# Patient Record
Sex: MALE | Race: WHITE | Employment: FULL TIME | URBAN - METROPOLITAN AREA
[De-identification: names, ages, dates, MRNs, and addresses within clinical notes are randomized per-mention and may not be internally consistent; named-entity substitution may affect disease eponyms.]

---

## 2018-07-13 PROBLEM — I10 ESSENTIAL HYPERTENSION: Status: ACTIVE | Noted: 2018-07-13

## 2018-07-13 PROBLEM — R73.03 PREDIABETES: Status: ACTIVE | Noted: 2018-07-13

## 2021-07-14 ENCOUNTER — HOSPITAL ENCOUNTER (OUTPATIENT)
Dept: GENERAL RADIOLOGY | Facility: CLINIC | Age: 56
Discharge: HOME | End: 2021-07-16
Payer: COMMERCIAL

## 2021-07-14 ENCOUNTER — HOSPITAL ENCOUNTER (OUTPATIENT)
Facility: CLINIC | Age: 56
Discharge: HOME | End: 2021-07-16
Payer: COMMERCIAL

## 2021-07-14 DIAGNOSIS — R05.9 COUGH: ICD-10-CM

## 2021-07-14 PROCEDURE — 71046 X-RAY EXAM CHEST 2 VIEWS: CPT

## 2021-10-19 ENCOUNTER — HOSPITAL ENCOUNTER (OUTPATIENT)
Facility: CLINIC | Age: 56
Discharge: HOME | End: 2021-10-19
Payer: COMMERCIAL

## 2021-10-19 DIAGNOSIS — Z12.5 SCREENING PSA (PROSTATE SPECIFIC ANTIGEN): ICD-10-CM

## 2021-10-19 DIAGNOSIS — R73.03 PREDIABETES: ICD-10-CM

## 2021-10-19 DIAGNOSIS — I10 ESSENTIAL HYPERTENSION: ICD-10-CM

## 2021-10-19 LAB
ALBUMIN SERPL-MCNC: 4.4 G/DL (ref 3.5–5.2)
ALBUMIN/GLOBULIN RATIO: 1.7 (ref 1–2.5)
ALP BLD-CCNC: 54 U/L (ref 40–129)
ALT SERPL-CCNC: 31 U/L (ref 5–41)
ANION GAP SERPL CALCULATED.3IONS-SCNC: 10 MMOL/L (ref 9–17)
AST SERPL-CCNC: 22 U/L
BILIRUB SERPL-MCNC: 1.06 MG/DL (ref 0.3–1.2)
BUN BLDV-MCNC: 18 MG/DL (ref 6–20)
BUN/CREAT BLD: ABNORMAL (ref 9–20)
CALCIUM SERPL-MCNC: 9.4 MG/DL (ref 8.6–10.4)
CHLORIDE BLD-SCNC: 101 MMOL/L (ref 98–107)
CHOLESTEROL, FASTING: 232 MG/DL
CHOLESTEROL/HDL RATIO: 4.5
CO2: 28 MMOL/L (ref 20–31)
CREAT SERPL-MCNC: 0.75 MG/DL (ref 0.7–1.2)
GFR AFRICAN AMERICAN: >60 ML/MIN
GFR NON-AFRICAN AMERICAN: >60 ML/MIN
GFR SERPL CREATININE-BSD FRML MDRD: ABNORMAL ML/MIN/{1.73_M2}
GFR SERPL CREATININE-BSD FRML MDRD: ABNORMAL ML/MIN/{1.73_M2}
GLUCOSE FASTING: 117 MG/DL (ref 70–99)
HDLC SERPL-MCNC: 51 MG/DL
LDL CHOLESTEROL: 125 MG/DL (ref 0–130)
POTASSIUM SERPL-SCNC: 4.7 MMOL/L (ref 3.7–5.3)
PROSTATE SPECIFIC ANTIGEN: 1.03 UG/L
SODIUM BLD-SCNC: 139 MMOL/L (ref 135–144)
TOTAL PROTEIN: 7 G/DL (ref 6.4–8.3)
TRIGLYCERIDE, FASTING: 279 MG/DL
VLDLC SERPL CALC-MCNC: ABNORMAL MG/DL (ref 1–30)

## 2021-10-19 PROCEDURE — 83036 HEMOGLOBIN GLYCOSYLATED A1C: CPT

## 2021-10-19 PROCEDURE — 36415 COLL VENOUS BLD VENIPUNCTURE: CPT

## 2021-10-19 PROCEDURE — 80053 COMPREHEN METABOLIC PANEL: CPT

## 2021-10-19 PROCEDURE — 80061 LIPID PANEL: CPT

## 2021-10-19 PROCEDURE — G0103 PSA SCREENING: HCPCS

## 2021-10-20 LAB
ESTIMATED AVERAGE GLUCOSE: 123 MG/DL
HBA1C MFR BLD: 5.9 % (ref 4–6)

## 2021-10-22 ENCOUNTER — HOSPITAL ENCOUNTER (OUTPATIENT)
Dept: GENERAL RADIOLOGY | Facility: CLINIC | Age: 56
Discharge: HOME | End: 2021-10-24
Payer: COMMERCIAL

## 2021-10-22 DIAGNOSIS — M25.472 LEFT ANKLE SWELLING: ICD-10-CM

## 2021-10-22 PROCEDURE — 73610 X-RAY EXAM OF ANKLE: CPT

## 2021-11-15 DIAGNOSIS — M25.572 ACUTE LEFT ANKLE PAIN: Primary | ICD-10-CM

## 2021-11-18 ENCOUNTER — OFFICE VISIT (OUTPATIENT)
Dept: ORTHOPEDIC SURGERY | Age: 56
End: 2021-11-18
Payer: COMMERCIAL

## 2021-11-18 VITALS — WEIGHT: 247 LBS | HEIGHT: 73 IN | BODY MASS INDEX: 32.74 KG/M2 | RESPIRATION RATE: 16 BRPM

## 2021-11-18 DIAGNOSIS — M24.573 EQUINUS CONTRACTURE OF ANKLE: ICD-10-CM

## 2021-11-18 DIAGNOSIS — M19.072 OSTEOARTHRITIS OF LEFT ANKLE OR FOOT: Primary | ICD-10-CM

## 2021-11-18 PROCEDURE — 99204 OFFICE O/P NEW MOD 45 MIN: CPT | Performed by: ORTHOPAEDIC SURGERY

## 2021-11-18 PROCEDURE — 20605 DRAIN/INJ JOINT/BURSA W/O US: CPT | Performed by: ORTHOPAEDIC SURGERY

## 2021-11-18 RX ORDER — LIDOCAINE HYDROCHLORIDE 10 MG/ML
2 INJECTION, SOLUTION INFILTRATION; PERINEURAL ONCE
Status: SHIPPED | OUTPATIENT
Start: 2021-11-18

## 2021-11-18 RX ORDER — TRIAMCINOLONE ACETONIDE 40 MG/ML
40 INJECTION, SUSPENSION INTRA-ARTICULAR; INTRAMUSCULAR ONCE
Status: SHIPPED | OUTPATIENT
Start: 2021-11-18

## 2021-11-18 NOTE — PROGRESS NOTES
[facility] AND [facility]TS MEDICINE  [facility]  [address] [address]  Dept: [phone]    Ambulatory Orthopedic Consult      CHIEF COMPLAINT:    Chief Complaint   Patient presents with    Ankle Pain     Left        HISTORY OF PRESENT ILLNESS:      The patient is a 55 y.o. male who is being seen for evaluation of the above, which began 15 years ago  . At today's visit, he is using no brace/assistive device. History is obtained today from:   [x]  the patient     [x]  EMR     []  one family member/friend    []  multiple family members/friends    []  other:          REVIEW OF SYSTEMS:  Musculoskeletal: See HPI for pertinent positives     Past Medical History:    He  has no past medical history on file. Past Surgical History:    He  has a past surgical history that includes Lithotripsy and Tonsillectomy. Current Medications:     Current Outpatient Medications:     benazepril (LOTENSIN) 20 MG tablet, Take 1 tablet by mouth once daily, Disp: 90 tablet, Rfl: 1    metFORMIN (GLUCOPHAGE) 500 MG tablet, TAKE 1 TABLET BY MOUTH TWICE DAILY WITH MEALS, Disp: 180 tablet, Rfl: 1    nebivolol (BYSTOLIC) 10 MG tablet, Take 1 tablet by mouth once daily, Disp: 90 tablet, Rfl: 1    sildenafil (VIAGRA) 50 MG tablet, Take 1 tablet by mouth daily as needed for Erectile Dysfunction, Disp: 30 tablet, Rfl: 0    VASCEPA 1 g CAPS capsule, Take 2 capsules by mouth twice daily, Disp: 360 capsule, Rfl: 2    traZODone (DESYREL) 50 MG tablet, TAKE 1 TABLET BY MOUTH NIGHTLY AS NEEDED FOR SLEEP, Disp: 30 tablet, Rfl: 0    fluticasone (FLOVENT HFA) 110 MCG/ACT inhaler, Inhale 1 puff into the lungs 2 times daily, Disp: 1 Inhaler, Rfl: 0    TWINRIX 720-20 injection, , Disp: , Rfl:      Allergies:    Patient has no known allergies. Family History:  family history includes Other in his mother.     Social History:   Social History     Occupational History    Not on file Tobacco Use    Smoking status: Never Smoker    Smokeless tobacco: Never Used   Substance and Sexual Activity    Alcohol use: Yes     Comment: 1-2 per week     Drug use: No    Sexual activity: Not on file         OBJECTIVE:  Resp 16   Ht 6' 1\" (1.854 m)   Wt 247 lb (112 kg)   BMI 32.59 kg/m²    Psych: awake, alert  Cardio:  well perfused extremities, no cyanosis  Resp:  normal respiratory effort  Musculoskeletal:    RLE:  Vascular: Limb well perfused, compartments soft/compressible. Skin: No erythema/ulcers. Intact. Neurovascular Status:  Grossly neurovascularly intact throughout   Tenderness to Palpation: anterior ankle joint line  -equinus      LLE:  Vascular: Limb well perfused, compartments soft/compressible. Skin: No erythema/ulcers. Intact. Neurovascular Status:  Grossly neurovascularly intact throughout   Tenderness to Palpation:   -      RADIOLOGY:   11/18/2021 FINDINGS:  Three weightbearing views (AP, Mortise, and Lateral) of the left ankle and three weightbearing views (AP, Oblique, Lateral) of the left foot were obtained in the office today and reviewed, revealing no acute fracture, dislocation, or radioopaque foreign body/tumor. The ankle mortise is maintained with no widening of the clear spaces. Degenerative changes of the tibiotalar with joint space narrowing, sclerosis, and osteophytes. IMPRESSION: No acute fracture/dislocation. Degenerative changes as above. Electronically signed by Ruddy Tripathi MD    Relevant previous imaging reviewed, both imaging and report(s) as below:    No results found. ASSESSMENT AND PLAN:  Body mass index is 32.59 kg/m². He has left ankle arthritis with varus tilt of the talus. Notably, he has the past medical history as above. He has a history of prediabetes.     We had a discussion today about the likely diagnosis and its natural history, physical exam and imaging findings, as well as various treatment and/or new imaging/labs results, we may consider the following options:    []  Lace up ankle     []  CAM boot         []  removable wrist brace     []  PT:        []  Wean out immobilization         []  Adv activity      []  Footmind        []  Spenco       []  Custom Orthotic:               []  AZ brace                    []  Rocker Bottom      []  Night splint    []  Heel cups        []  Strap        []  Toe gizmos    []  Topl        []  NSAIDs         []  Jordan        []  Ref:         []  Stress Xray    []  CT        []  MRI  []  Inj:          []  Consider OR      []  Pick OR date    No follow-ups on file. No orders of the defined types were placed in this encounter. Orders Placed This Encounter   Procedures    DME Order for Orthosis as OP     Eval and treat. Please provide custom [place] brace. Claudia Caraballo MD  Orthopedic Surgery, Foot and Ankle         Claudia Caraballo MD  Orthopedic Surgery        Please excuse any typos/errors, as this note was created with the assistance of voice recognition software. While intending to generate a document that actually reflects the content of the visit, the document can still have some errors including those of syntax and sound-a-like substitutions which may escape proof reading. In such instances, actual meaning can be extrapolated by context.

## 2021-11-18 NOTE — Clinical Note
[address] [place] [address] and Sports [address] [address]  Phone: [phone]  Fax: [phone] [facility], [place]    November 18, 2021     [id number]    Patient: Sancho Roblero   MR Number: [id number]   YOB: 1965   Date of Visit: 11/18/2021       Dear Sonia Beard:    Thank you for referring Jesica Sky to me for evaluation/treatment. Below are the relevant portions of my assessment and plan of care. He has ***. Notably, he has {MARY SINGLE:61281::\"a complex past medical history\",\"a somewhat complex past medical history\",\"the past medical history as above\",\"no relevant past medical history\"}. ***    We had a discussion today about the likely diagnosis and its natural history, physical exam and imaging findings, as well as various treatment options in detail. Surgically, we discussed a possible future ***, depending on the patient's clinical course. At today's visit, we did decide to proceed with conservative management. ***    Orders/referrals were placed as below at today's visit. The patient was referred to prosthetics and orthotics to obtain a custom [place] brace. ***    All questions were answered and the above plan was agreed upon. The patient will return to clinic {Mary single:04127::\"PRN\",\"in 1 week\",\"in 2 weeks\",\"in 1 month\",\"in 6 weeks\",\"in 6 weeks PRN\",\"in 8 weeks\",\"in 3 months\",\"in 3 months PRN\",\"in 6 months\",\"in 1 year\",\"after the CT has been obtained\",\"after the MRI has been obtained\",\"6 weeks after the brace has been obtained\",\"after the *** has been obtained\",\"for a surgical discussion\",\"postoperatively\",\"in *** weeks\",\"in *** months\"} ***. If you have questions, please do not hesitate to call me. I look forward to following Hattie Obrien along with you.     Sincerely,      Catalina Wong MD

## 2021-11-18 NOTE — LETTER
Dr. Arlette Casey  [address] [facility] [address]  [phone]        11/18/2021     Patient: Maikel Sexton  YOB: 1965    Dear Carolina Jaimes DO,    I had the pleasure of seeing one of your patients, Maikel Sexton recently in the office. Below are the relevant portions of my assessment and plan of care. ASSESSMENT AND PLAN:  He has left ankle arthritis with varus tilt of the talus. Notably, he has the past medical history as above. He has a history of prediabetes. We had a discussion today about the likely diagnosis and its natural history, physical exam and imaging findings, as well as various treatment options in detail. Surgically, we discussed a possible future total ankle replacement versus ankle fusion, depending on the patient's clinical course. We did discuss that I do believe the patient would be a good candidate for total ankle placement, whenever he wishes to proceed in the future, depending on his symptom control with conservative management. At today's visit, we did decide to proceed with conservative management, as he reports he has not tried much conservative management, and reports that he can still walk 3 to 4 miles, but does have significant pain with doing so. Orders/referrals were placed as below at today's visit. The patient was referred to prosthetics and orthotics to obtain a custom [place] brace. I provided a prescription for Voltaren (4g TOPL q QID PRN pain). -After discussing his options, both surgical and nonsurgical, he wished to proceed with a left ankle injection as below. All questions were answered and the above plan was agreed upon. The patient will return to clinic in 3 months without x-rays. At his next visit, we will follow-up on the results of his injection plus his [place] brace, and possibly consider repeat injection in the future.        I look

## 2022-03-03 ENCOUNTER — OFFICE VISIT (OUTPATIENT)
Dept: ORTHOPEDIC SURGERY | Age: 57
End: 2022-03-03
Payer: COMMERCIAL

## 2022-03-03 VITALS — WEIGHT: 247 LBS | HEIGHT: 73 IN | BODY MASS INDEX: 32.74 KG/M2 | RESPIRATION RATE: 16 BRPM

## 2022-03-03 DIAGNOSIS — M19.072 OSTEOARTHRITIS OF LEFT ANKLE OR FOOT: Primary | ICD-10-CM

## 2022-03-03 DIAGNOSIS — M24.573 EQUINUS CONTRACTURE OF ANKLE: ICD-10-CM

## 2022-03-03 PROCEDURE — 99212 OFFICE O/P EST SF 10 MIN: CPT | Performed by: ORTHOPAEDIC SURGERY

## 2022-03-03 NOTE — PROGRESS NOTES
[address] [place] [address] AND SPORTS MEDICINE  [facility]  [address] [place] [address]  Dept: [phone]    Ambulatory Orthopedic Consult      CHIEF COMPLAINT:    Chief Complaint   Patient presents with    Ankle Pain     Left        HISTORY OF PRESENT ILLNESS:      The patient is a 65 y.o. male who is being seen for evaluation of the above, which began 15 years ago  . At today's visit, he is using no brace/assistive device. History is obtained today from:   [x]  the patient     [x]  EMR     []  one family member/friend    []  multiple family members/friends    []  other:      INTERVAL HISTORY 3/3/2022:  He is seen again today in the office for follow up of a previous issue (as above). Since being seen last, the patient is doing about the same overall, but he reports that the pain is occurring less often. At today's visit, he is not using a brace or assistive device. History is obtained today from:   [x]  the patient     []  EMR     []  one family member/friend    []  multiple family members/friends    []  other:            REVIEW OF SYSTEMS:  Musculoskeletal: See HPI for pertinent positives     Past Medical History:    He  has no past medical history on file. Past Surgical History:    He  has a past surgical history that includes Lithotripsy and Tonsillectomy.      Current Medications:     Current Outpatient Medications:     diclofenac sodium (VOLTAREN) 1 % GEL, Apply 2 g topically 4 times daily as needed for Pain, Disp: 200 g, Rfl: 0    benazepril (LOTENSIN) 20 MG tablet, Take 1 tablet by mouth once daily, Disp: 90 tablet, Rfl: 1    metFORMIN (GLUCOPHAGE) 500 MG tablet, TAKE 1 TABLET BY MOUTH TWICE DAILY WITH MEALS, Disp: 180 tablet, Rfl: 1    nebivolol (BYSTOLIC) 10 MG tablet, Take 1 tablet by mouth once daily, Disp: 90 tablet, Rfl: 1    sildenafil (VIAGRA) 50 MG tablet, Take 1 tablet by mouth daily as needed for Erectile Dysfunction, Disp: 30 tablet, Rfl: 0    VASCEPA 1 g CAPS capsule, Take 2 capsules by mouth twice daily, Disp: 360 capsule, Rfl: 2    traZODone (DESYREL) 50 MG tablet, TAKE 1 TABLET BY MOUTH NIGHTLY AS NEEDED FOR SLEEP, Disp: 30 tablet, Rfl: 0    fluticasone (FLOVENT HFA) 110 MCG/ACT inhaler, Inhale 1 puff into the lungs 2 times daily, Disp: 1 Inhaler, Rfl: 0    TWINRIX 720-20 injection, , Disp: , Rfl:      Allergies:    Patient has no known allergies. Family History:  family history includes Other in his mother. Social History:   Social History     Occupational History    Not on file   Tobacco Use    Smoking status: Never Smoker    Smokeless tobacco: Never Used   Substance and Sexual Activity    Alcohol use: Yes     Comment: 1-2 per week     Drug use: No    Sexual activity: Not on file         OBJECTIVE:  Resp 16   Ht 6' 1\" (1.854 m)   Wt 247 lb (112 kg)   BMI 32.59 kg/m²    Psych: awake, alert  Cardio:  well perfused extremities, no cyanosis  Resp:  normal respiratory effort  Musculoskeletal:    RLE:  Vascular: Limb well perfused, compartments soft/compressible. Skin: No erythema/ulcers. Intact. Neurovascular Status:  Grossly neurovascularly intact throughout   Tenderness to Palpation: anterior ankle joint line  -equinus      LLE:  Vascular: Limb well perfused, compartments soft/compressible. Skin: No erythema/ulcers. Intact. Neurovascular Status:  Grossly neurovascularly intact throughout   Tenderness to Palpation:   -      RADIOLOGY:   3/3/2022 No new radiology images today. Prior images reviewed for reference. FINDINGS:  Three weightbearing views (AP, Mortise, and Lateral) of the left ankle and three weightbearing views (AP, Oblique, Lateral) of the left foot were obtained in the office today and reviewed, revealing no acute fracture, dislocation, or radioopaque foreign body/tumor. The ankle mortise is maintained with no widening of the clear spaces. Degenerative changes of the tibiotalar with joint space narrowing, sclerosis, and osteophytes. IMPRESSION: No acute fracture/dislocation. Degenerative changes as above. Electronically signed by Mis Russ MD    Relevant previous imaging reviewed, both imaging and report(s) as below:    No results found. ASSESSMENT AND PLAN:  Body mass index is 32.59 kg/m². He has left ankle arthritis with varus tilt of the talus. A left ankle injection on 11/19/2021 helped his pain approximately 90%. Notably, he has the past medical history as above. He has a history of prediabetes. We had a discussion today about the likely diagnosis and its natural history, physical exam and imaging findings, as well as various treatment options in detail. Surgically, we have again discussed a possible future total ankle placement versus ankle fusion, depending on his clinical course, and we have discussed that I do believe he would be a good candidate for a total ankle replacement, whenever he wishes to proceed in the future depending on his symptoms. At today's visit, as he reports that he is still able to walk 3 to 4 miles (albeit with significant pain), and as his injection is still significantly helping his pain, we discussed that we will continue with conservative management. Orders/referrals were placed as below at today's visit. He will continue to use his prescribed topical Voltaren gel as needed for pain, and may continue to use his custom [place] brace as needed for pain (he reports that it is bulky and somewhat impractical, but does report that it helps with his pain)    All questions were answered and the above plan was agreed upon. The patient will return to clinic in the future as needed. At his next visit, we may consider a repeat left ankle injection, and possibly consider surgery in the future whenever his symptoms warrant this.                      At the patient's next visit, depending on how the patient is doing and/or new imaging/labs results, we may consider the following options:    []  Lace up ankle     []  CAM boot         []  removable wrist brace     []  PT:        []  Wean out immobilization         []  Adv activity      []  Footmind        []  Spenco       []  Custom Orthotic:               []  AZ brace                    []  Rocker Bottom      []  Night splint    []  Heel cups        []  Strap        []  Toe gizmos    []  Topl        []  NSAIDs         []  Jordan        []  Ref:         []  Stress Xray    []  CT        []  MRI  []  Inj:          []  Consider OR      []  Pick OR date    No follow-ups on file. No orders of the defined types were placed in this encounter. No orders of the defined types were placed in this encounter. Delio Camargo MD  Orthopedic Surgery        Please excuse any typos/errors, as this note was created with the assistance of voice recognition software. While intending to generate a document that actually reflects the content of the visit, the document can still have some errors including those of syntax and sound-a-like substitutions which may escape proof reading. In such instances, actual meaning can be extrapolated by context.

## 2022-04-21 ENCOUNTER — HOSPITAL ENCOUNTER (OUTPATIENT)
Facility: CLINIC | Age: 57
Discharge: HOME | End: 2022-04-21
Payer: COMMERCIAL

## 2022-04-21 DIAGNOSIS — I10 ESSENTIAL HYPERTENSION: ICD-10-CM

## 2022-04-21 DIAGNOSIS — R73.03 PREDIABETES: ICD-10-CM

## 2022-04-21 LAB
ALBUMIN SERPL-MCNC: 4.5 G/DL (ref 3.5–5.2)
ALBUMIN/GLOBULIN RATIO: 2 (ref 1–2.5)
ALP BLD-CCNC: 53 U/L (ref 40–129)
ALT SERPL-CCNC: 24 U/L (ref 5–41)
ANION GAP SERPL CALCULATED.3IONS-SCNC: 11 MMOL/L (ref 9–17)
AST SERPL-CCNC: 18 U/L
BILIRUB SERPL-MCNC: 1.01 MG/DL (ref 0.3–1.2)
BUN BLDV-MCNC: 15 MG/DL (ref 6–20)
CALCIUM SERPL-MCNC: 9.5 MG/DL (ref 8.6–10.4)
CHLORIDE BLD-SCNC: 101 MMOL/L (ref 98–107)
CO2: 28 MMOL/L (ref 20–31)
CREAT SERPL-MCNC: 0.69 MG/DL (ref 0.7–1.2)
ESTIMATED AVERAGE GLUCOSE: 123 MG/DL
GFR AFRICAN AMERICAN: >60 ML/MIN
GFR NON-AFRICAN AMERICAN: >60 ML/MIN
GFR SERPL CREATININE-BSD FRML MDRD: ABNORMAL ML/MIN/{1.73_M2}
GLUCOSE FASTING: 110 MG/DL (ref 70–99)
HBA1C MFR BLD: 5.9 % (ref 4–6)
POTASSIUM SERPL-SCNC: 4.6 MMOL/L (ref 3.7–5.3)
SODIUM BLD-SCNC: 140 MMOL/L (ref 135–144)
TOTAL PROTEIN: 6.8 G/DL (ref 6.4–8.3)

## 2022-04-21 PROCEDURE — 80053 COMPREHEN METABOLIC PANEL: CPT

## 2022-04-21 PROCEDURE — 36415 COLL VENOUS BLD VENIPUNCTURE: CPT

## 2022-04-21 PROCEDURE — 83036 HEMOGLOBIN GLYCOSYLATED A1C: CPT

## 2022-11-02 ENCOUNTER — HOSPITAL ENCOUNTER (OUTPATIENT)
Facility: CLINIC | Age: 57
Discharge: HOME | End: 2022-11-02
Payer: COMMERCIAL

## 2022-11-02 DIAGNOSIS — R73.03 PREDIABETES: ICD-10-CM

## 2022-11-02 DIAGNOSIS — E78.1 HYPERTRIGLYCERIDEMIA: ICD-10-CM

## 2022-11-02 LAB
ALBUMIN SERPL-MCNC: 4.4 G/DL (ref 3.5–5.2)
ALBUMIN/GLOBULIN RATIO: 1.6 (ref 1–2.5)
ALP BLD-CCNC: 53 U/L (ref 40–129)
ALT SERPL-CCNC: 20 U/L (ref 5–41)
ANION GAP SERPL CALCULATED.3IONS-SCNC: 11 MMOL/L (ref 9–17)
AST SERPL-CCNC: 17 U/L
BILIRUB SERPL-MCNC: 1.1 MG/DL (ref 0.3–1.2)
BUN BLDV-MCNC: 15 MG/DL (ref 6–20)
CALCIUM SERPL-MCNC: 9.2 MG/DL (ref 8.6–10.4)
CHLORIDE BLD-SCNC: 101 MMOL/L (ref 98–107)
CHOLESTEROL, FASTING: 245 MG/DL
CHOLESTEROL/HDL RATIO: 4.9
CO2: 27 MMOL/L (ref 20–31)
CREAT SERPL-MCNC: 0.7 MG/DL (ref 0.7–1.2)
GFR SERPL CREATININE-BSD FRML MDRD: >60 ML/MIN/1.73M2
GLUCOSE FASTING: 98 MG/DL (ref 70–99)
HDLC SERPL-MCNC: 50 MG/DL
LDL CHOLESTEROL: 144 MG/DL (ref 0–130)
POTASSIUM SERPL-SCNC: 4.3 MMOL/L (ref 3.7–5.3)
PROSTATE SPECIFIC ANTIGEN: 1.21 NG/ML
SODIUM BLD-SCNC: 139 MMOL/L (ref 135–144)
TOTAL PROTEIN: 7.2 G/DL (ref 6.4–8.3)
TRIGLYCERIDE, FASTING: 257 MG/DL

## 2022-11-02 PROCEDURE — 83036 HEMOGLOBIN GLYCOSYLATED A1C: CPT

## 2022-11-02 PROCEDURE — 80061 LIPID PANEL: CPT

## 2022-11-02 PROCEDURE — 80053 COMPREHEN METABOLIC PANEL: CPT

## 2022-11-02 PROCEDURE — 36415 COLL VENOUS BLD VENIPUNCTURE: CPT

## 2022-11-02 PROCEDURE — G0103 PSA SCREENING: HCPCS

## 2022-11-03 LAB
ESTIMATED AVERAGE GLUCOSE: 123 MG/DL
HBA1C MFR BLD: 5.9 % (ref 4–6)

## 2022-11-04 PROBLEM — N52.8 OTHER MALE ERECTILE DYSFUNCTION: Status: ACTIVE | Noted: 2022-11-04

## 2022-11-04 PROBLEM — E78.1 HYPERTRIGLYCERIDEMIA: Status: ACTIVE | Noted: 2022-11-04

## 2023-06-21 ENCOUNTER — OFFICE VISIT (OUTPATIENT)
Dept: PRIMARY CARE CLINIC | Age: 58
End: 2023-06-21
Payer: COMMERCIAL

## 2023-06-21 VITALS
SYSTOLIC BLOOD PRESSURE: 106 MMHG | BODY MASS INDEX: 31.68 KG/M2 | DIASTOLIC BLOOD PRESSURE: 70 MMHG | HEART RATE: 57 BPM | HEIGHT: 73 IN | WEIGHT: 239 LBS | OXYGEN SATURATION: 97 %

## 2023-06-21 DIAGNOSIS — I10 ESSENTIAL HYPERTENSION: ICD-10-CM

## 2023-06-21 DIAGNOSIS — N52.8 OTHER MALE ERECTILE DYSFUNCTION: ICD-10-CM

## 2023-06-21 DIAGNOSIS — E78.1 HYPERTRIGLYCERIDEMIA: Primary | ICD-10-CM

## 2023-06-21 DIAGNOSIS — R73.03 PREDIABETES: ICD-10-CM

## 2023-06-21 PROCEDURE — 99214 OFFICE O/P EST MOD 30 MIN: CPT | Performed by: FAMILY MEDICINE

## 2023-06-21 PROCEDURE — 3078F DIAST BP <80 MM HG: CPT | Performed by: FAMILY MEDICINE

## 2023-06-21 PROCEDURE — 3074F SYST BP LT 130 MM HG: CPT | Performed by: FAMILY MEDICINE

## 2023-06-21 SDOH — ECONOMIC STABILITY: FOOD INSECURITY: WITHIN THE PAST 12 MONTHS, YOU WORRIED THAT YOUR FOOD WOULD RUN OUT BEFORE YOU GOT MONEY TO BUY MORE.: NEVER TRUE

## 2023-06-21 SDOH — ECONOMIC STABILITY: INCOME INSECURITY: HOW HARD IS IT FOR YOU TO PAY FOR THE VERY BASICS LIKE FOOD, HOUSING, MEDICAL CARE, AND HEATING?: NOT HARD AT ALL

## 2023-06-21 SDOH — ECONOMIC STABILITY: FOOD INSECURITY: WITHIN THE PAST 12 MONTHS, THE FOOD YOU BOUGHT JUST DIDN'T LAST AND YOU DIDN'T HAVE MONEY TO GET MORE.: NEVER TRUE

## 2023-06-21 SDOH — ECONOMIC STABILITY: HOUSING INSECURITY
IN THE LAST 12 MONTHS, WAS THERE A TIME WHEN YOU DID NOT HAVE A STEADY PLACE TO SLEEP OR SLEPT IN A SHELTER (INCLUDING NOW)?: NO

## 2023-06-21 ASSESSMENT — PATIENT HEALTH QUESTIONNAIRE - PHQ9
1. LITTLE INTEREST OR PLEASURE IN DOING THINGS: 0
SUM OF ALL RESPONSES TO PHQ QUESTIONS 1-9: 0
SUM OF ALL RESPONSES TO PHQ QUESTIONS 1-9: 0
SUM OF ALL RESPONSES TO PHQ9 QUESTIONS 1 & 2: 0
SUM OF ALL RESPONSES TO PHQ QUESTIONS 1-9: 0
2. FEELING DOWN, DEPRESSED OR HOPELESS: 0
SUM OF ALL RESPONSES TO PHQ QUESTIONS 1-9: 0

## 2023-06-21 NOTE — PROGRESS NOTES
[facility]  [address]   [place],  [address]  Phone: [phone]       Name: Jamil Key  : 1965     Chief Complaint:    Jamil Key is a 63y.o. year old male who presents today for   Chief Complaint   Patient presents with    Hypertension    6 Month Follow-Up     History of Present Illness:    HTN -  patient here for follow up on HTN. He has been taking medications as prescribed. Hypertriglyceridemia - patient here for follow up. Last labs were 2022. He is getting vascepa without issues, as far as he knows. ED - taking viagra PRN.      Medications:    Outpatient Medications Prior to Visit   Medication Sig Dispense Refill    metFORMIN (GLUCOPHAGE) 500 MG tablet TAKE 1 TABLET BY MOUTH TWICE DAILY WITH MEALS 180 tablet 0    benazepril (LOTENSIN) 20 MG tablet Take 1 tablet by mouth once daily 90 tablet 0    nebivolol (BYSTOLIC) 10 MG tablet Take 1 tablet by mouth once daily 90 tablet 0    VASCEPA 1 g CAPS capsule Take 2 capsules by mouth twice daily 360 capsule 0    sildenafil (VIAGRA) 50 MG tablet Take 1 tablet by mouth daily as needed for Erectile Dysfunction 30 tablet 0    diclofenac sodium (VOLTAREN) 1 % GEL Apply 2 g topically 4 times daily as needed for Pain (Patient not taking: Reported on 2023) 200 g 0    fluticasone (FLOVENT HFA) 110 MCG/ACT inhaler Inhale 1 puff into the lungs 2 times daily (Patient not taking: Reported on 2023) 1 Inhaler 0    TWINRIX 720-20 injection        Facility-Administered Medications Prior to Visit   Medication Dose Route Frequency Provider Last Rate Last Admin    lidocaine 1 % injection 2 mL  2 mL Intra-artICUlar Once Sierra Michele MD        triamcinolone acetonide (KENALOG-40) injection 40 mg  40 mg Intra-artICUlar Once Sierra Michele MD           Review of Systems:     Review of Systems     Physical Exam:     Vitals:  /70 (Site: Left Upper Arm, Position: Sitting, Cuff Size: Large Adult)   Pulse 57

## 2023-07-19 ENCOUNTER — OFFICE VISIT (OUTPATIENT)
Dept: PRIMARY CARE CLINIC | Age: 58
End: 2023-07-19
Payer: COMMERCIAL

## 2023-07-19 VITALS
SYSTOLIC BLOOD PRESSURE: 128 MMHG | WEIGHT: 241 LBS | DIASTOLIC BLOOD PRESSURE: 84 MMHG | HEIGHT: 73 IN | BODY MASS INDEX: 31.94 KG/M2 | HEART RATE: 63 BPM | OXYGEN SATURATION: 96 %

## 2023-07-19 DIAGNOSIS — M70.22 OLECRANON BURSITIS OF LEFT ELBOW: Primary | ICD-10-CM

## 2023-07-19 DIAGNOSIS — N52.8 OTHER MALE ERECTILE DYSFUNCTION: ICD-10-CM

## 2023-07-19 PROCEDURE — 3079F DIAST BP 80-89 MM HG: CPT | Performed by: FAMILY MEDICINE

## 2023-07-19 PROCEDURE — 3074F SYST BP LT 130 MM HG: CPT | Performed by: FAMILY MEDICINE

## 2023-07-19 PROCEDURE — 99213 OFFICE O/P EST LOW 20 MIN: CPT | Performed by: FAMILY MEDICINE

## 2023-07-19 RX ORDER — PREDNISONE 50 MG/1
50 TABLET ORAL DAILY
Qty: 5 TABLET | Refills: 0 | Status: SHIPPED | OUTPATIENT
Start: 2023-07-19 | End: 2023-07-24

## 2023-07-19 RX ORDER — SILDENAFIL 50 MG/1
50 TABLET, FILM COATED ORAL DAILY PRN
Qty: 30 TABLET | Refills: 0 | Status: SHIPPED | OUTPATIENT
Start: 2023-07-19

## 2023-09-14 DIAGNOSIS — E78.1 HYPERTRIGLYCERIDEMIA: ICD-10-CM

## 2023-09-14 DIAGNOSIS — R73.03 PREDIABETES: ICD-10-CM

## 2023-09-14 DIAGNOSIS — I10 ESSENTIAL HYPERTENSION: ICD-10-CM

## 2023-09-14 RX ORDER — ICOSAPENT ETHYL 1000 MG/1
2 CAPSULE ORAL 2 TIMES DAILY
Qty: 360 CAPSULE | Refills: 0 | Status: SHIPPED | OUTPATIENT
Start: 2023-09-14

## 2023-09-14 RX ORDER — BENAZEPRIL HYDROCHLORIDE 20 MG/1
20 TABLET ORAL DAILY
Qty: 90 TABLET | Refills: 0 | Status: SHIPPED | OUTPATIENT
Start: 2023-09-14

## 2023-09-14 RX ORDER — NEBIVOLOL 10 MG/1
10 TABLET ORAL DAILY
Qty: 90 TABLET | Refills: 0 | Status: SHIPPED | OUTPATIENT
Start: 2023-09-14

## 2023-09-14 NOTE — TELEPHONE ENCOUNTER
Looks like in chart there has been requests sent to [address] [place] [address] (8/30/23 and 9/11/23) and refused and he has been out of medication since 8/24/23 give or take    James Calderon is requesting a refill on the following medication(s):  Requested Prescriptions     Pending Prescriptions Disp Refills    benazepril (LOTENSIN) 20 MG tablet 90 tablet 0     Sig: Take 1 tablet by mouth daily    nebivolol (BYSTOLIC) 10 MG tablet 90 tablet 0     Sig: Take 1 tablet by mouth daily       Last Visit Date (If Applicable):  5/15/2819    Next Visit Date:    12/4/2023

## 2023-09-14 NOTE — TELEPHONE ENCOUNTER
Looks like in chart there has been requests sent to [address] (8/30/23 and 9/11/23) and refused and he has been out of medication since 8/24/23 give or take      Fan Payton is requesting a refill on the following medication(s):  Requested Prescriptions     Pending Prescriptions Disp Refills    metFORMIN (GLUCOPHAGE) 500 MG tablet 180 tablet 0     Sig: Take 1 tablet by mouth with breakfast and with evening meal    VASCEPA 1 g CAPS capsule 360 capsule 0     Sig: Take 2 capsules by mouth 2 times daily       Last Visit Date (If Applicable):  7/19/1175    Next Visit Date:    12/4/2023

## 2023-09-14 NOTE — TELEPHONE ENCOUNTER
Looks like in chart there has been a request sent to [address] [place] [address] (9/11/23) and refused and he has been out of medication since 8/24/23 give or take      Delphine Favre is requesting a refill on the following medication(s):  Requested Prescriptions     Pending Prescriptions Disp Refills    metFORMIN (GLUCOPHAGE) 500 MG tablet 180 tablet 0     Sig: Take 1 tablet by mouth with breakfast and with evening meal       Last Visit Date (If Applicable):  0/07/3659    Next Visit Date:    12/4/2023

## 2023-09-27 ENCOUNTER — OFFICE VISIT (OUTPATIENT)
Dept: FAMILY MEDICINE CLINIC | Age: 58
End: 2023-09-27
Payer: COMMERCIAL

## 2023-09-27 VITALS
BODY MASS INDEX: 32.2 KG/M2 | SYSTOLIC BLOOD PRESSURE: 120 MMHG | HEIGHT: 73 IN | HEART RATE: 57 BPM | WEIGHT: 243 LBS | OXYGEN SATURATION: 97 % | DIASTOLIC BLOOD PRESSURE: 74 MMHG

## 2023-09-27 DIAGNOSIS — R73.03 PREDIABETES: ICD-10-CM

## 2023-09-27 DIAGNOSIS — E78.1 HYPERTRIGLYCERIDEMIA: ICD-10-CM

## 2023-09-27 DIAGNOSIS — Z12.5 SCREENING PSA (PROSTATE SPECIFIC ANTIGEN): ICD-10-CM

## 2023-09-27 DIAGNOSIS — N52.8 OTHER MALE ERECTILE DYSFUNCTION: ICD-10-CM

## 2023-09-27 DIAGNOSIS — I10 ESSENTIAL HYPERTENSION: Primary | ICD-10-CM

## 2023-09-27 PROCEDURE — 3074F SYST BP LT 130 MM HG: CPT | Performed by: FAMILY MEDICINE

## 2023-09-27 PROCEDURE — 3078F DIAST BP <80 MM HG: CPT | Performed by: FAMILY MEDICINE

## 2023-09-27 PROCEDURE — 90715 TDAP VACCINE 7 YRS/> IM: CPT | Performed by: FAMILY MEDICINE

## 2023-09-27 PROCEDURE — 90471 IMMUNIZATION ADMIN: CPT | Performed by: FAMILY MEDICINE

## 2023-09-27 PROCEDURE — 99214 OFFICE O/P EST MOD 30 MIN: CPT | Performed by: FAMILY MEDICINE

## 2023-09-27 RX ORDER — SEMAGLUTIDE 0.25 MG/.5ML
0.25 INJECTION, SOLUTION SUBCUTANEOUS
Qty: 2 ML | Refills: 0 | Status: SHIPPED | OUTPATIENT
Start: 2023-09-27

## 2023-09-27 RX ORDER — SEMAGLUTIDE 0.25 MG/.5ML
0.25 INJECTION, SOLUTION SUBCUTANEOUS
Qty: 2 ML | Refills: 0 | Status: SHIPPED | OUTPATIENT
Start: 2023-09-27 | End: 2023-09-27

## 2023-09-27 RX ORDER — SILDENAFIL 50 MG/1
50 TABLET, FILM COATED ORAL DAILY PRN
Qty: 30 TABLET | Refills: 5 | Status: SHIPPED | OUTPATIENT
Start: 2023-09-27

## 2023-10-18 ENCOUNTER — TELEPHONE (OUTPATIENT)
Dept: ADMINISTRATIVE | Age: 58
End: 2023-10-18

## 2023-10-18 NOTE — TELEPHONE ENCOUNTER
Submitted PA for Wegovy 0.25MG/0.5ML auto-injectors  Via CMM Key: [id number] STATUS: APPROVED  Coverage Start Date:09/18/2023  Coverage End Date:05/15/2024    If this requires a response please respond to the pool ( P [id number]). Thank you please advise patient.

## 2023-12-12 DIAGNOSIS — E78.1 HYPERTRIGLYCERIDEMIA: ICD-10-CM

## 2023-12-12 DIAGNOSIS — I10 ESSENTIAL HYPERTENSION: ICD-10-CM

## 2023-12-12 RX ORDER — BENAZEPRIL HYDROCHLORIDE 20 MG/1
20 TABLET ORAL DAILY
Qty: 90 TABLET | Refills: 0 | Status: SHIPPED | OUTPATIENT
Start: 2023-12-12

## 2023-12-12 RX ORDER — NEBIVOLOL 10 MG/1
10 TABLET ORAL DAILY
Qty: 90 TABLET | Refills: 0 | Status: SHIPPED | OUTPATIENT
Start: 2023-12-12

## 2023-12-12 RX ORDER — ICOSAPENT ETHYL 1000 MG/1
2 CAPSULE ORAL 2 TIMES DAILY
Qty: 360 CAPSULE | Refills: 0 | Status: SHIPPED | OUTPATIENT
Start: 2023-12-12

## 2023-12-13 DIAGNOSIS — R73.03 PREDIABETES: ICD-10-CM

## 2023-12-13 NOTE — TELEPHONE ENCOUNTER
Medication:   Requested Prescriptions     Pending Prescriptions Disp Refills    metFORMIN (GLUCOPHAGE) 500 MG tablet 180 tablet 0     Sig: Take 1 tablet by mouth with breakfast and with evening meal       Last Filled:  9/14/23    Patient Phone Number: [phone] (home)     Last appt: 9/27/2023   Next appt: Visit date not found    Last Labs DM:   Lab Results   Component Value Date/Time    LABA1C 5.9 11/02/2022 12:36 PM

## 2024-01-02 ENCOUNTER — OFFICE VISIT (OUTPATIENT)
Dept: FAMILY MEDICINE CLINIC | Age: 59
End: 2024-01-02
Payer: COMMERCIAL

## 2024-01-02 VITALS
HEART RATE: 72 BPM | OXYGEN SATURATION: 98 % | WEIGHT: 248.2 LBS | DIASTOLIC BLOOD PRESSURE: 80 MMHG | BODY MASS INDEX: 32.89 KG/M2 | HEIGHT: 73 IN | SYSTOLIC BLOOD PRESSURE: 124 MMHG

## 2024-01-02 DIAGNOSIS — R05.1 ACUTE COUGH: ICD-10-CM

## 2024-01-02 DIAGNOSIS — J40 BRONCHITIS: Primary | ICD-10-CM

## 2024-01-02 PROCEDURE — 3079F DIAST BP 80-89 MM HG: CPT | Performed by: NURSE PRACTITIONER

## 2024-01-02 PROCEDURE — 99213 OFFICE O/P EST LOW 20 MIN: CPT | Performed by: NURSE PRACTITIONER

## 2024-01-02 PROCEDURE — 3074F SYST BP LT 130 MM HG: CPT | Performed by: NURSE PRACTITIONER

## 2024-01-02 RX ORDER — BENZONATATE 100 MG/1
100 CAPSULE ORAL 3 TIMES DAILY PRN
Qty: 42 CAPSULE | Refills: 0 | Status: SHIPPED | OUTPATIENT
Start: 2024-01-02 | End: 2024-01-16

## 2024-01-02 RX ORDER — BUDESONIDE, GLYCOPYRROLATE, AND FORMOTEROL FUMARATE 160; 9; 4.8 UG/1; UG/1; UG/1
2 AEROSOL, METERED RESPIRATORY (INHALATION) 2 TIMES DAILY
Qty: 1 EACH | Refills: 0 | Status: SHIPPED | COMMUNITY
Start: 2024-01-02 | End: 2024-01-09

## 2024-01-02 RX ORDER — PREDNISONE 20 MG/1
20 TABLET ORAL 2 TIMES DAILY
Qty: 10 TABLET | Refills: 0 | Status: SHIPPED | OUTPATIENT
Start: 2024-01-02 | End: 2024-01-07

## 2024-01-02 ASSESSMENT — ENCOUNTER SYMPTOMS
EYE ITCHING: 0
DIARRHEA: 0
SINUS PAIN: 0
ABDOMINAL PAIN: 0
TROUBLE SWALLOWING: 0
STRIDOR: 0
BACK PAIN: 0
COLOR CHANGE: 0
NAUSEA: 0
CONSTIPATION: 0
SINUS PRESSURE: 0
VOMITING: 0
WHEEZING: 1
SORE THROAT: 0
EYE DISCHARGE: 0
SHORTNESS OF BREATH: 0
CHEST TIGHTNESS: 1
PHOTOPHOBIA: 0
COUGH: 1
EYE PAIN: 0
CHOKING: 0
BLOOD IN STOOL: 0
EYE REDNESS: 0
VOICE CHANGE: 0
RHINORRHEA: 0

## 2024-01-02 ASSESSMENT — PATIENT HEALTH QUESTIONNAIRE - PHQ9
SUM OF ALL RESPONSES TO PHQ9 QUESTIONS 1 & 2: 0
1. LITTLE INTEREST OR PLEASURE IN DOING THINGS: 0
SUM OF ALL RESPONSES TO PHQ QUESTIONS 1-9: 0
2. FEELING DOWN, DEPRESSED OR HOPELESS: 0
SUM OF ALL RESPONSES TO PHQ QUESTIONS 1-9: 0

## 2024-01-02 NOTE — PROGRESS NOTES
Coordination normal.   Psychiatric:         Attention and Perception: Attention and perception normal.         Mood and Affect: Mood normal.         Speech: Speech normal.         Behavior: Behavior normal. Behavior is cooperative.         Thought Content: Thought content normal.         Cognition and Memory: Cognition normal.         Judgment: Judgment normal.         ASSESSMENT/PLAN:    1. Bronchitis    - predniSONE (DELTASONE) 20 MG tablet; Take 1 tablet by mouth 2 times daily for 5 days  Dispense: 10 tablet; Refill: 0  - benzonatate (TESSALON) 100 MG capsule; Take 1 capsule by mouth 3 times daily as needed for Cough  Dispense: 42 capsule; Refill: 0  - Budeson-Glycopyrrol-Formoterol (BREZTRI AEROSPHERE) 160-9-4.8 MCG/ACT AERO; Inhale 2 puffs into the lungs in the morning and at bedtime for 7 days  Dispense: 1 each; Refill: 0    2. Acute cough    - predniSONE (DELTASONE) 20 MG tablet; Take 1 tablet by mouth 2 times daily for 5 days  Dispense: 10 tablet; Refill: 0  - benzonatate (TESSALON) 100 MG capsule; Take 1 capsule by mouth 3 times daily as needed for Cough  Dispense: 42 capsule; Refill: 0  - Budeson-Glycopyrrol-Formoterol (BREZTRI AEROSPHERE) 160-9-4.8 MCG/ACT AERO; Inhale 2 puffs into the lungs in the morning and at bedtime for 7 days  Dispense: 1 each; Refill: 0    Educated on signs and symptoms of pneumonia and patient will call if these occur.  Will consider chest x-ray at that time      Recommended saltwater gargles, warm fluids with honey and a humidifier at night. Flonase, Zyrtec, NSAIDS as needed.  Follow up if symptoms worsen or do not improve.

## 2024-01-19 ENCOUNTER — TELEPHONE (OUTPATIENT)
Dept: FAMILY MEDICINE CLINIC | Age: 59
End: 2024-01-19

## 2024-01-19 DIAGNOSIS — R73.03 PREDIABETES: ICD-10-CM

## 2024-01-19 NOTE — TELEPHONE ENCOUNTER
Patient called in stating that he has been unable to receive wegovy and would like to switch to Zepound. Please send to   [facility]- [address]

## 2024-01-19 NOTE — TELEPHONE ENCOUNTER
Medication:   Requested Prescriptions     Pending Prescriptions Disp Refills    metFORMIN (GLUCOPHAGE) 500 MG tablet [Pharmacy Med Name: metFORMIN HCl 500 MG Oral Tablet] 180 tablet 0     Sig: TAKE 1 TABLET BY MOUTH WITH BREAKFAST AND WITH EVENING MEAL        Last Filled:  12/14/23    Patient Phone Number: [phone] (home)     Last appt: 1/2/2024   Next appt: 1/19/2024    Last OARRS:        No data to display

## 2024-01-22 RX ORDER — TIRZEPATIDE 2.5 MG/.5ML
2.5 INJECTION, SOLUTION SUBCUTANEOUS WEEKLY
Qty: 2 ML | Refills: 0 | Status: SHIPPED | OUTPATIENT
Start: 2024-01-22

## 2024-01-24 ENCOUNTER — TELEPHONE (OUTPATIENT)
Dept: ADMINISTRATIVE | Age: 59
End: 2024-01-24

## 2024-01-24 NOTE — TELEPHONE ENCOUNTER
Submitted PA for Zepbound 2.5MG/0.5ML pen-injectors  Via CMM Key: [id number] STATUS: APPROVED  Coverage Start Date:12/25/2023  Coverage End Date:09/20/2024    If this requires a response please respond to the pool ( P MHCX PSC MEDICATION PRE-AUTH).      Thank you please advise patient.

## 2024-02-13 ENCOUNTER — TELEPHONE (OUTPATIENT)
Dept: FAMILY MEDICINE CLINIC | Age: 59
End: 2024-02-13

## 2024-02-13 RX ORDER — TIRZEPATIDE 5 MG/.5ML
5 INJECTION, SOLUTION SUBCUTANEOUS WEEKLY
Qty: 2 ML | Refills: 0 | Status: SHIPPED | OUTPATIENT
Start: 2024-02-13

## 2024-02-13 NOTE — TELEPHONE ENCOUNTER
Patient is calling in stating that he is due in about a week for his Zepbound renewal. He has no concerns with being on the 2.5 mg but he would like to move up to the 5 mg if  thinks that is necessary. Please advise.

## 2024-03-06 DIAGNOSIS — E78.1 HYPERTRIGLYCERIDEMIA: ICD-10-CM

## 2024-03-06 DIAGNOSIS — I10 ESSENTIAL HYPERTENSION: ICD-10-CM

## 2024-03-06 RX ORDER — TIRZEPATIDE 5 MG/.5ML
5 INJECTION, SOLUTION SUBCUTANEOUS WEEKLY
Qty: 4 ML | Refills: 0 | Status: SHIPPED | OUTPATIENT
Start: 2024-03-06

## 2024-03-06 RX ORDER — ICOSAPENT ETHYL 1000 MG/1
2 CAPSULE ORAL 2 TIMES DAILY
Qty: 360 CAPSULE | Refills: 0 | Status: SHIPPED | OUTPATIENT
Start: 2024-03-06

## 2024-03-06 RX ORDER — BENAZEPRIL HYDROCHLORIDE 20 MG/1
20 TABLET ORAL DAILY
Qty: 90 TABLET | Refills: 0 | Status: SHIPPED | OUTPATIENT
Start: 2024-03-06

## 2024-03-06 RX ORDER — NEBIVOLOL 10 MG/1
10 TABLET ORAL DAILY
Qty: 90 TABLET | Refills: 0 | Status: SHIPPED | OUTPATIENT
Start: 2024-03-06

## 2024-03-06 NOTE — TELEPHONE ENCOUNTER
Left a detailed message for patient.Advising him to call and schedule a follow up and have labs drawn

## 2024-03-06 NOTE — TELEPHONE ENCOUNTER
Medication:   Requested Prescriptions     Pending Prescriptions Disp Refills    ZEPBOUND 5 MG/0.5ML SOAJ [Pharmacy Med Name: Zepbound 5 MG/0.5ML Subcutaneous Solution Auto-injector] 4 mL 0     Sig: INJECT 1 SYRINGE SUBCUTANEOUSLY ONCE A WEEK      2/13/24    Patient Phone Number: [phone] (home)     Last appt: 1/2/2024   Next appt: Visit date not found    Last OARRS:        No data to display

## 2024-03-07 ENCOUNTER — TELEPHONE (OUTPATIENT)
Dept: ADMINISTRATIVE | Age: 59
End: 2024-03-07

## 2024-03-07 NOTE — TELEPHONE ENCOUNTER
Submitted PA for Vascepa 1GM capsules (Brand)  Via CMM Key: [id number]  STATUS: APPROVED  Coverage Start Date:02/06/2024  Coverage End Date:03/07/2025     If this requires a response please respond to the pool ( P MHCX PSC MEDICATION PRE-AUTH).      Thank you please advise patient.

## 2024-03-26 NOTE — TELEPHONE ENCOUNTER
Medication:   Requested Prescriptions     Pending Prescriptions Disp Refills    ZEPBOUND 5 MG/0.5ML SOAJ [Pharmacy Med Name: Zepbound 5 MG/0.5ML Subcutaneous Solution Auto-injector] 4 mL 0     Sig: INJECT 1 SYRINGE SUBCUTANEOUSLY ONCE A WEEK        Last Filled:  03/06/24    Patient Phone Number: [phone] (home)     Last appt: 1/2/2024   Next appt: Visit date not found    Last OARRS:        No data to display

## 2024-03-27 RX ORDER — TIRZEPATIDE 5 MG/.5ML
5 INJECTION, SOLUTION SUBCUTANEOUS WEEKLY
Qty: 4 ML | Refills: 0 | Status: SHIPPED | OUTPATIENT
Start: 2024-03-27

## 2024-04-25 NOTE — TELEPHONE ENCOUNTER
Medication:   Requested Prescriptions     Pending Prescriptions Disp Refills    ZEPBOUND 5 MG/0.5ML SOAJ [Pharmacy Med Name: Zepbound 5 MG/0.5ML Subcutaneous Solution Auto-injector] 4 mL 0     Sig: INJECT 1 SYRINGE SUBCUTANEOUSLY ONCE A WEEK        Last Filled:  3/27/24    Patient Phone Number: [phone] (home)     Last appt: 1/2/2024   Next appt: 5/16/2024    Last OARRS:        No data to display

## 2024-04-26 RX ORDER — TIRZEPATIDE 5 MG/.5ML
5 INJECTION, SOLUTION SUBCUTANEOUS WEEKLY
Qty: 4 ML | Refills: 0 | Status: SHIPPED | OUTPATIENT
Start: 2024-04-26

## 2024-05-01 ENCOUNTER — TELEPHONE (OUTPATIENT)
Dept: FAMILY MEDICINE CLINIC | Age: 59
End: 2024-05-01

## 2024-05-01 NOTE — TELEPHONE ENCOUNTER
Pt stated that his dosage of 5 MG of tirzepatide is sold out everywhere and would like to know if the dosage can drop down to 2.5 MG for a month until his regular dosage is back in stock. If so he would like it to be sent to [facility] in [place], [place] [address].

## 2024-05-02 ENCOUNTER — TELEPHONE (OUTPATIENT)
Dept: FAMILY MEDICINE CLINIC | Age: 59
End: 2024-05-02

## 2024-05-02 RX ORDER — TIRZEPATIDE 2.5 MG/.5ML
2.5 INJECTION, SOLUTION SUBCUTANEOUS WEEKLY
Qty: 2 ML | Refills: 2 | Status: SHIPPED | OUTPATIENT
Start: 2024-05-02

## 2024-05-02 NOTE — TELEPHONE ENCOUNTER
Pt stated that he tried to get his Zepbound refilled but they are out everywhere of the 5 MG. Pt would like to have the dosage lowered for the month until his normal dosage is back in stock. Pt would like to know if his Zepbound can be sent in 2.5 MG to the [facility] in [place], [place].

## 2024-05-03 ENCOUNTER — TELEPHONE (OUTPATIENT)
Dept: FAMILY MEDICINE CLINIC | Age: 59
End: 2024-05-03

## 2024-05-03 NOTE — TELEPHONE ENCOUNTER
Called [facility] and spoke to Madeleine Salvador and I were able to do the Plan Limit Override for Zepbound 2.5MG/0.5ML pen-injectors on the phone.  Override was APPROVED 04/03/24-06/02/24, Case number: [id number].  I will place the approval letter in this encounter when received.    If this requires a response please respond to the pool ( P MHCX PSC MEDICATION PRE-AUTH).      Please advise the pharmacy.  Thank you!     RTC 1 week with lab start cycle #3 will call insurance

## 2024-05-03 NOTE — TELEPHONE ENCOUNTER
Angela at [facility] stated that the pt's insurance needs a plan limit override for the 2.5 MG of Zepbound. Contact [phone].[facility]. Insurance ID # [id number]. BIN # [id number]. Group [place]. Angela also left her contact info at [phone] if any questions arise.

## 2024-05-10 DIAGNOSIS — Z12.5 SCREENING PSA (PROSTATE SPECIFIC ANTIGEN): ICD-10-CM

## 2024-05-10 DIAGNOSIS — R73.03 PREDIABETES: ICD-10-CM

## 2024-05-10 DIAGNOSIS — I10 ESSENTIAL HYPERTENSION: ICD-10-CM

## 2024-05-10 LAB
ALBUMIN SERPL-MCNC: 4.2 G/DL (ref 3.4–5)
ALBUMIN/GLOB SERPL: 1.8 {RATIO} (ref 1.1–2.2)
ALP SERPL-CCNC: 54 U/L (ref 40–129)
ALT SERPL-CCNC: 14 U/L (ref 10–40)
ANION GAP SERPL CALCULATED.3IONS-SCNC: 10 MMOL/L (ref 3–16)
AST SERPL-CCNC: 14 U/L (ref 15–37)
BILIRUB SERPL-MCNC: 1.1 MG/DL (ref 0–1)
BUN SERPL-MCNC: 16 MG/DL (ref 7–20)
CALCIUM SERPL-MCNC: 9.1 MG/DL (ref 8.3–10.6)
CHLORIDE SERPL-SCNC: 103 MMOL/L (ref 99–110)
CHOLEST SERPL-MCNC: 197 MG/DL (ref 0–199)
CO2 SERPL-SCNC: 27 MMOL/L (ref 21–32)
CREAT SERPL-MCNC: 0.8 MG/DL (ref 0.9–1.3)
GFR SERPLBLD CREATININE-BSD FMLA CKD-EPI: >90 ML/MIN/{1.73_M2}
GLUCOSE SERPL-MCNC: 94 MG/DL (ref 70–99)
HDLC SERPL-MCNC: 52 MG/DL (ref 40–60)
LDLC SERPL CALC-MCNC: 105 MG/DL
POTASSIUM SERPL-SCNC: 4.6 MMOL/L (ref 3.5–5.1)
PROT SERPL-MCNC: 6.5 G/DL (ref 6.4–8.2)
PSA SERPL DL<=0.01 NG/ML-MCNC: 1.3 NG/ML (ref 0–4)
SODIUM SERPL-SCNC: 140 MMOL/L (ref 136–145)
TRIGL SERPL-MCNC: 200 MG/DL (ref 0–150)
VLDLC SERPL CALC-MCNC: 40 MG/DL

## 2024-05-11 LAB
EST. AVERAGE GLUCOSE BLD GHB EST-MCNC: 102.5 MG/DL
HBA1C MFR BLD: 5.2 %

## 2024-05-16 ENCOUNTER — OFFICE VISIT (OUTPATIENT)
Dept: FAMILY MEDICINE CLINIC | Age: 59
End: 2024-05-16
Payer: COMMERCIAL

## 2024-05-16 VITALS
DIASTOLIC BLOOD PRESSURE: 62 MMHG | SYSTOLIC BLOOD PRESSURE: 126 MMHG | HEART RATE: 79 BPM | HEIGHT: 73 IN | OXYGEN SATURATION: 98 % | BODY MASS INDEX: 30.09 KG/M2 | WEIGHT: 227 LBS

## 2024-05-16 DIAGNOSIS — M79.672 PAIN IN BOTH FEET: ICD-10-CM

## 2024-05-16 DIAGNOSIS — I10 ESSENTIAL HYPERTENSION: Primary | ICD-10-CM

## 2024-05-16 DIAGNOSIS — M79.671 PAIN IN BOTH FEET: ICD-10-CM

## 2024-05-16 DIAGNOSIS — R73.03 PREDIABETES: ICD-10-CM

## 2024-05-16 DIAGNOSIS — E78.1 HYPERTRIGLYCERIDEMIA: ICD-10-CM

## 2024-05-16 PROCEDURE — 3078F DIAST BP <80 MM HG: CPT | Performed by: FAMILY MEDICINE

## 2024-05-16 PROCEDURE — 99214 OFFICE O/P EST MOD 30 MIN: CPT | Performed by: FAMILY MEDICINE

## 2024-05-16 PROCEDURE — 3074F SYST BP LT 130 MM HG: CPT | Performed by: FAMILY MEDICINE

## 2024-05-16 RX ORDER — NEBIVOLOL 10 MG/1
10 TABLET ORAL DAILY
Qty: 90 TABLET | Refills: 1 | Status: SHIPPED | OUTPATIENT
Start: 2024-05-16

## 2024-05-16 RX ORDER — ICOSAPENT ETHYL 1000 MG/1
2 CAPSULE ORAL 2 TIMES DAILY
Qty: 360 CAPSULE | Refills: 1 | Status: SHIPPED | OUTPATIENT
Start: 2024-05-16

## 2024-05-16 RX ORDER — TIRZEPATIDE 7.5 MG/.5ML
7.5 INJECTION, SOLUTION SUBCUTANEOUS WEEKLY
Qty: 2 ML | Refills: 5 | Status: SHIPPED | OUTPATIENT
Start: 2024-05-16

## 2024-05-16 RX ORDER — BENAZEPRIL HYDROCHLORIDE 20 MG/1
20 TABLET ORAL DAILY
Qty: 90 TABLET | Refills: 1 | Status: SHIPPED | OUTPATIENT
Start: 2024-05-16

## 2024-05-16 NOTE — PROGRESS NOTES
Umesh Shin (:  1965) is a 59 y.o. male,Established patient, here for evaluation of the following chief complaint(s):  6 Month Follow-Up      Assessment & Plan   ASSESSMENT/PLAN:  Umesh was seen today for 6 month follow-up.    Diagnoses and all orders for this visit:    Essential hypertension  -     benazepril (LOTENSIN) 20 MG tablet; Take 1 tablet by mouth daily  -     nebivolol (BYSTOLIC) 10 MG tablet; Take 1 tablet by mouth daily  The current medical regimen is effective;  continue present plan and medications.   Hypertriglyceridemia  -     VASCEPA 1 g CAPS capsule; Take 2 capsules by mouth 2 times daily  Stable on vascepa  Prediabetes  -     metFORMIN (GLUCOPHAGE) 500 MG tablet; Take 1 tablet by mouth with breakfast and with evening meal  Stable with diet  BMI 32.0-32.9,adult  -     Tirzepatide-Weight Management (ZEPBOUND) 7.5 MG/0.5ML SOAJ; Inject 7.5 mg into the skin once a week  Doing well on zepbound.  Titrate up for better effect  Pain in both feet  -     External Referral To Orthopedic Surgery  Not well controlled. Referred to ortho       No follow-ups on file.         Subjective   SUBJECTIVE/OBJECTIVE:  HPI  Pt is a of 59 y.o. male comes in today with   Chief Complaint   Patient presents with    6 Month Follow-Up     Wt down with zepbound. Interested in increasing dosage. Has been helpful but not noticing as much effect. No n/v. Has gerd once/week.    Vitals:    24 0914   BP: 126/62   Pulse: 79   SpO2: 98%   Weight: 103 kg (227 lb)   Height: 1.854 m (6' 1\")       Past Medical History:Reviewed  Medications:Reviewed.  Allergies   Allergen Reactions    Niacin Rash      Social hx:Reviewed.  Social History     Tobacco Use   Smoking Status Never   Smokeless Tobacco Never        Review of Systems       Objective   Physical Exam         An electronic signature was used to authenticate this note.    --Vincent Schroeder MD

## 2024-05-23 ENCOUNTER — TELEPHONE (OUTPATIENT)
Dept: FAMILY MEDICINE CLINIC | Age: 59
End: 2024-05-23

## 2024-05-23 RX ORDER — TIRZEPATIDE 5 MG/.5ML
5 INJECTION, SOLUTION SUBCUTANEOUS WEEKLY
Qty: 2 ML | Refills: 0 | Status: SHIPPED | OUTPATIENT
Start: 2024-05-23

## 2024-05-23 NOTE — TELEPHONE ENCOUNTER
Pt stated that the pharmacy does not have the Zepbound in 7.5 MG but do have it in 5 MG and is holding some for him. Pt would like to request the medication temporarily be lowered so that he can get it to 5 Mg which he says he is currently on from 7.5 MG. Zepbound 5 Mg to [facility] in [place], [place].

## 2024-06-11 ENCOUNTER — TELEPHONE (OUTPATIENT)
Dept: FAMILY MEDICINE CLINIC | Age: 59
End: 2024-06-11

## 2024-06-11 RX ORDER — TIRZEPATIDE 7.5 MG/.5ML
7.5 INJECTION, SOLUTION SUBCUTANEOUS WEEKLY
Qty: 2 ML | Refills: 5 | Status: SHIPPED | OUTPATIENT
Start: 2024-06-11

## 2024-06-11 NOTE — TELEPHONE ENCOUNTER
Pt stated that he is on Zepbound and at one point they did not have the 7.5 Mg so they went down to 5 Mg. Pt stated that his pharmacy now has the 7.5 MG and they need Dr Schroeder to sign off on it giving the ok for the dosage change to [facility] in [place], [place].

## 2024-07-09 ENCOUNTER — TELEPHONE (OUTPATIENT)
Dept: FAMILY MEDICINE CLINIC | Age: 59
End: 2024-07-09

## 2024-07-10 NOTE — TELEPHONE ENCOUNTER
Submitted PA for Zepbound 7.5MG/0.5ML pen-injectors  Via CMM Key: [id number] STATUS: An active PA is already on file with expiration date of 09/20/2024.      If this requires a response please respond to the pool ( P MHCX PSC MEDICATION PRE-AUTH).      Thank you please advise patient.

## 2024-07-16 ENCOUNTER — TELEPHONE (OUTPATIENT)
Dept: FAMILY MEDICINE CLINIC | Age: 59
End: 2024-07-16

## 2024-07-16 RX ORDER — TIRZEPATIDE 7.5 MG/.5ML
7.5 INJECTION, SOLUTION SUBCUTANEOUS WEEKLY
Qty: 2 ML | Refills: 5 | Status: SHIPPED | OUTPATIENT
Start: 2024-07-16

## 2024-07-16 NOTE — TELEPHONE ENCOUNTER
Ins coKarley Ventura wants pt Zepbound to be a 10 mg to cover med   Ins want a PA   When finished send to [facility] on [address]

## 2024-07-16 NOTE — TELEPHONE ENCOUNTER
Medication:   Requested Prescriptions     Pending Prescriptions Disp Refills    Tirzepatide-Weight Management (ZEPBOUND) 7.5 MG/0.5ML SOAJ 2 mL 5     Sig: Inject 7.5 mg into the skin once a week        Last Filled:  6/11/24    Patient Phone Number: [phone] (home)     Last appt: 5/16/2024   Next appt: Visit date not found    Last OARRS:        No data to display

## 2024-07-17 RX ORDER — TIRZEPATIDE 10 MG/.5ML
10 INJECTION, SOLUTION SUBCUTANEOUS WEEKLY
Qty: 2 ML | Refills: 2 | Status: SHIPPED | OUTPATIENT
Start: 2024-07-17

## 2024-07-17 NOTE — TELEPHONE ENCOUNTER
There was a PA received for Zepbound 10 mg/0.5 ml, but there is not a current RX on file.    Current RX on file is 7.5mg/0.5 ml. For PA details see encounter 7/09/2024    If you want PA please submit new RX, and resend this PA request back to the pool    If this requires a response please respond to the pool ( P MHCX PSC MEDICATION PRE-AUTH).      Thank you please advise patient.

## 2024-07-18 NOTE — TELEPHONE ENCOUNTER
Submitted PA for Zepbound 10MG/0.5ML pen-injectors  Via [facility] [id number]  STATUS: No PA Needed.     Outcome  N/A  CaseId:[id number];Status:Cancelled;Explanation:Approval on File. [id number]    If this requires a response please respond to the pool ( P MHCX PSC MEDICATION PRE-AUTH).      Thank you please advise patient.

## 2024-10-07 RX ORDER — TIRZEPATIDE 10 MG/.5ML
10 INJECTION, SOLUTION SUBCUTANEOUS WEEKLY
Qty: 12 ML | Refills: 0 | Status: SHIPPED | OUTPATIENT
Start: 2024-10-07

## 2024-10-07 NOTE — TELEPHONE ENCOUNTER
Medication:   Requested Prescriptions     Pending Prescriptions Disp Refills    ZEPBOUND 10 MG/0.5ML SOAJ [Pharmacy Med Name: Zepbound 10 MG/0.5ML Subcutaneous Solution Auto-injector] 12 mL 0     Sig: INJECT 1 SYRINGE SUBCUTANEOUSLY ONCE A WEEK (INJECT  10MG  INTO  THE  SKIN  ONCE  A  WEEK)       Last Filled:  7/17/24    Patient Phone Number: [phone] (home)     Last appt: 5/16/2024   Next appt: Visit date not found    Last Labs DM:   Lab Results   Component Value Date/Time    LABA1C 5.2 05/10/2024 08:01 AM     Last Lipid:   Lab Results   Component Value Date/Time    CHOL 197 05/10/2024 08:01 AM    TRIG 200 05/10/2024 08:01 AM    HDL 52 05/10/2024 08:01 AM     Last PSA:   Lab Results   Component Value Date/Time    PSA 1.30 05/10/2024 08:01 AM     Last Thyroid: No results found for: \"TSH\", \"FT3\", \"O3WAJZZ\", \"T4FREE\"

## 2024-10-14 NOTE — TELEPHONE ENCOUNTER
Medication:   Requested Prescriptions     Pending Prescriptions Disp Refills    ZEPBOUND 10 MG/0.5ML SOAJ [Pharmacy Med Name: ZEPBOUND 10MG/0.5ML   INJ] 24 mL 0     Sig: INJECT 1 SYRINGE SUBCUTANEOUSLY ONCE A WEEK        Last Filled:  10/7/24    Patient Phone Number: [phone] (home)     Last appt: 5/16/2024   Next appt: Visit date not found    Last OARRS:        No data to display

## 2024-10-17 ENCOUNTER — TELEPHONE (OUTPATIENT)
Dept: ADMINISTRATIVE | Age: 59
End: 2024-10-17

## 2024-10-17 RX ORDER — TIRZEPATIDE 10 MG/.5ML
10 INJECTION, SOLUTION SUBCUTANEOUS WEEKLY
Qty: 2 ML | Refills: 5 | Status: SHIPPED | OUTPATIENT
Start: 2024-10-17 | End: 2024-10-17 | Stop reason: SDUPTHER

## 2024-10-17 RX ORDER — TIRZEPATIDE 10 MG/.5ML
10 INJECTION, SOLUTION SUBCUTANEOUS WEEKLY
Qty: 2 ML | Refills: 5 | Status: CANCELLED | OUTPATIENT
Start: 2024-10-17

## 2024-10-17 RX ORDER — TIRZEPATIDE 10 MG/.5ML
10 INJECTION, SOLUTION SUBCUTANEOUS WEEKLY
Qty: 2 ML | Refills: 5 | Status: SHIPPED | OUTPATIENT
Start: 2024-10-17

## 2024-10-17 NOTE — TELEPHONE ENCOUNTER
Submitted PA for Zepbound 10MG/0.5ML pen-injectors   Via [facility] Key: [id number]  STATUS: PENDING.    Follow up done daily; if no decision with in three days we will refax.  If another three days goes by with no decision will call the insurance for status.

## 2024-10-18 NOTE — TELEPHONE ENCOUNTER
Response via CMM: APPROVAL for Zepbound 10MG/0.5ML pen-injectors.  Coverage Start Date:09/17/2024  Coverage End Date:10/17/2025    If this requires a response please respond to the pool ( P MHCX PSC MEDICATION PRE-AUTH).      Thank you please advise patient.

## 2024-11-21 DIAGNOSIS — I10 ESSENTIAL HYPERTENSION: ICD-10-CM

## 2024-11-21 RX ORDER — NEBIVOLOL 10 MG/1
10 TABLET ORAL DAILY
Qty: 90 TABLET | Refills: 0 | Status: SHIPPED | OUTPATIENT
Start: 2024-11-21

## 2024-11-21 RX ORDER — BENAZEPRIL HYDROCHLORIDE 20 MG/1
20 TABLET ORAL DAILY
Qty: 90 TABLET | Refills: 0 | Status: SHIPPED | OUTPATIENT
Start: 2024-11-21

## 2024-11-21 NOTE — TELEPHONE ENCOUNTER
Medication:   Requested Prescriptions     Pending Prescriptions Disp Refills    benazepril (LOTENSIN) 20 MG tablet [Pharmacy Med Name: Benazepril HCl 20 MG Oral Tablet] 90 tablet 0     Sig: Take 1 tablet by mouth once daily    nebivolol (BYSTOLIC) 10 MG tablet [Pharmacy Med Name: Nebivolol HCl 10 MG Oral Tablet] 90 tablet 0     Sig: Take 1 tablet by mouth once daily       Last Filled:  5/16/24    Patient Phone Number: [phone] (home)     Last appt: 5/16/2024   Next appt: Visit date not found    Last Labs DM:   Lab Results   Component Value Date/Time    LABA1C 5.2 05/10/2024 08:01 AM     Last Lipid:   Lab Results   Component Value Date/Time    CHOL 197 05/10/2024 08:01 AM    TRIG 200 05/10/2024 08:01 AM    HDL 52 05/10/2024 08:01 AM     Last PSA:   Lab Results   Component Value Date/Time    PSA 1.30 05/10/2024 08:01 AM     Last Thyroid: No results found for: \"TSH\", \"FT3\", \"R3YDDLN\", \"T4FREE\"

## 2024-12-20 DIAGNOSIS — E78.1 HYPERTRIGLYCERIDEMIA: ICD-10-CM

## 2024-12-20 RX ORDER — ICOSAPENT ETHYL 1000 MG/1
2 CAPSULE ORAL 2 TIMES DAILY
Qty: 360 CAPSULE | Refills: 0 | Status: SHIPPED | OUTPATIENT
Start: 2024-12-20

## 2024-12-20 NOTE — TELEPHONE ENCOUNTER
Medication:   Requested Prescriptions     Pending Prescriptions Disp Refills    VASCEPA 1 g CAPS capsule [Pharmacy Med Name: Vascepa 1 GM Oral Capsule] 360 capsule 0     Sig: Take 2 capsules by mouth twice daily       Last Filled:  5/16/24    Patient Phone Number: [phone] (home)     Last appt: 5/16/2024   Next appt: Visit date not found    Last Labs DM:   Lab Results   Component Value Date/Time    LABA1C 5.2 05/10/2024 08:01 AM     Last Lipid:   Lab Results   Component Value Date/Time    CHOL 197 05/10/2024 08:01 AM    TRIG 200 05/10/2024 08:01 AM    HDL 52 05/10/2024 08:01 AM     Last PSA:   Lab Results   Component Value Date/Time    PSA 1.30 05/10/2024 08:01 AM     Last Thyroid: No results found for: \"TSH\", \"FT3\", \"D7KSGCJ\", \"T4FREE\"

## 2024-12-30 RX ORDER — ICOSAPENT ETHYL 1 G/1
2 CAPSULE ORAL 2 TIMES DAILY
Qty: 180 CAPSULE | Refills: 3 | Status: SHIPPED | OUTPATIENT
Start: 2024-12-30

## 2025-01-03 RX ORDER — TIRZEPATIDE 10 MG/.5ML
10 INJECTION, SOLUTION SUBCUTANEOUS WEEKLY
Qty: 2 ML | Refills: 5 | Status: SHIPPED | OUTPATIENT
Start: 2025-01-03

## 2025-01-03 NOTE — TELEPHONE ENCOUNTER
Medication:   Requested Prescriptions     Pending Prescriptions Disp Refills    tirzepatide-weight management (ZEPBOUND) 10 MG/0.5ML SOAJ subCUTAneous auto-injector pen 2 mL 5     Sig: Inject 10 mg into the skin once a week     Last Filled:  10/17/24    Last appt: 5/16/2024   Next appt: Visit date not found    Last OARRS:        No data to display

## 2025-01-20 ENCOUNTER — TELEPHONE (OUTPATIENT)
Dept: FAMILY MEDICINE CLINIC | Age: 60
End: 2025-01-20

## 2025-01-20 NOTE — TELEPHONE ENCOUNTER
[facility] Practice Support  ECC Message to Provider    Relationship to Patient: Self    Additional Information Patient wants a call back if his blood work order has been confirm  --------------------------------------------------------------------------------------------------------------------------    Call Back Information: OK to leave message on voicemail  Preferred Call Back Number: Phone [phone]

## 2025-02-10 ENCOUNTER — TELEPHONE (OUTPATIENT)
Dept: ADMINISTRATIVE | Age: 60
End: 2025-02-10

## 2025-02-10 NOTE — TELEPHONE ENCOUNTER
Submitted PA for Vascepa 1GM capsules (Brand)  Via CMM Key: [id number] STATUS: APPROVED, Case Id:[id number]   Coverage Start Date:01/11/2025  Coverage End Date:02/10/2026    If this requires a response please respond to the pool ( P MHCX PSC MEDICATION PRE-AUTH).      Thank you please advise patient.

## 2025-02-20 DIAGNOSIS — I10 ESSENTIAL HYPERTENSION: ICD-10-CM

## 2025-02-20 RX ORDER — NEBIVOLOL 10 MG/1
10 TABLET ORAL DAILY
Qty: 90 TABLET | Refills: 0 | Status: SHIPPED | OUTPATIENT
Start: 2025-02-20

## 2025-02-20 RX ORDER — BENAZEPRIL HYDROCHLORIDE 20 MG/1
20 TABLET ORAL DAILY
Qty: 90 TABLET | Refills: 0 | Status: SHIPPED | OUTPATIENT
Start: 2025-02-20

## 2025-02-20 NOTE — TELEPHONE ENCOUNTER
Medication:   Requested Prescriptions     Pending Prescriptions Disp Refills    benazepril (LOTENSIN) 20 MG tablet [Pharmacy Med Name: Benazepril HCl 20 MG Oral Tablet] 90 tablet 0     Sig: Take 1 tablet by mouth once daily    nebivolol (BYSTOLIC) 10 MG tablet [Pharmacy Med Name: Nebivolol HCl 10 MG Oral Tablet] 90 tablet 0     Sig: Take 1 tablet by mouth once daily        Last Filled:  11/21/24    Patient Phone Number: [phone] (home)     Last appt: 5/16/2024   Next appt: 3/31/2025    Last OARRS:        No data to display

## 2025-03-03 ENCOUNTER — TELEPHONE (OUTPATIENT)
Dept: FAMILY MEDICINE CLINIC | Age: 60
End: 2025-03-03

## 2025-03-03 DIAGNOSIS — Z12.5 SCREENING PSA (PROSTATE SPECIFIC ANTIGEN): Primary | ICD-10-CM

## 2025-03-03 DIAGNOSIS — R73.03 PREDIABETES: ICD-10-CM

## 2025-03-03 DIAGNOSIS — I10 ESSENTIAL HYPERTENSION: ICD-10-CM

## 2025-03-03 DIAGNOSIS — Z00.00 WELL ADULT EXAM: ICD-10-CM

## 2025-03-03 NOTE — TELEPHONE ENCOUNTER
Pt called asking for his blood work to be put in epic  Wants to add PSA also     Pt is scheduled on 3/31/2025 @8:20 with Dr Schroeder    Please call pt to advise

## 2025-03-25 DIAGNOSIS — I10 ESSENTIAL HYPERTENSION: ICD-10-CM

## 2025-03-25 DIAGNOSIS — Z12.5 SCREENING PSA (PROSTATE SPECIFIC ANTIGEN): ICD-10-CM

## 2025-03-25 DIAGNOSIS — R73.03 PREDIABETES: ICD-10-CM

## 2025-03-25 DIAGNOSIS — Z00.00 WELL ADULT EXAM: ICD-10-CM

## 2025-03-25 LAB
ALBUMIN SERPL-MCNC: 4.2 G/DL (ref 3.4–5)
ALBUMIN/GLOB SERPL: 2.2 {RATIO} (ref 1.1–2.2)
ALP SERPL-CCNC: 43 U/L (ref 40–129)
ALT SERPL-CCNC: 16 U/L (ref 10–40)
ANION GAP SERPL CALCULATED.3IONS-SCNC: 8 MMOL/L (ref 3–16)
AST SERPL-CCNC: 17 U/L (ref 15–37)
BILIRUB SERPL-MCNC: 1.3 MG/DL (ref 0–1)
BUN SERPL-MCNC: 12 MG/DL (ref 7–20)
CALCIUM SERPL-MCNC: 9.5 MG/DL (ref 8.3–10.6)
CHLORIDE SERPL-SCNC: 103 MMOL/L (ref 99–110)
CHOLEST SERPL-MCNC: 178 MG/DL (ref 0–199)
CO2 SERPL-SCNC: 30 MMOL/L (ref 21–32)
CREAT SERPL-MCNC: 0.7 MG/DL (ref 0.9–1.3)
EST. AVERAGE GLUCOSE BLD GHB EST-MCNC: 96.8 MG/DL
GFR SERPLBLD CREATININE-BSD FMLA CKD-EPI: >90 ML/MIN/{1.73_M2}
GLUCOSE SERPL-MCNC: 94 MG/DL (ref 70–99)
HBA1C MFR BLD: 5 %
HDLC SERPL-MCNC: 65 MG/DL (ref 40–60)
LDLC SERPL CALC-MCNC: 95 MG/DL
POTASSIUM SERPL-SCNC: 4.1 MMOL/L (ref 3.5–5.1)
PROT SERPL-MCNC: 6.1 G/DL (ref 6.4–8.2)
PSA SERPL DL<=0.01 NG/ML-MCNC: 1.36 NG/ML (ref 0–4)
SODIUM SERPL-SCNC: 141 MMOL/L (ref 136–145)
TRIGL SERPL-MCNC: 88 MG/DL (ref 0–150)
VLDLC SERPL CALC-MCNC: 18 MG/DL

## 2025-03-30 ASSESSMENT — PATIENT HEALTH QUESTIONNAIRE - PHQ9
1. LITTLE INTEREST OR PLEASURE IN DOING THINGS: NOT AT ALL
2. FEELING DOWN, DEPRESSED OR HOPELESS: NOT AT ALL
1. LITTLE INTEREST OR PLEASURE IN DOING THINGS: NOT AT ALL
SUM OF ALL RESPONSES TO PHQ QUESTIONS 1-9: 0
2. FEELING DOWN, DEPRESSED OR HOPELESS: NOT AT ALL
SUM OF ALL RESPONSES TO PHQ9 QUESTIONS 1 & 2: 0
SUM OF ALL RESPONSES TO PHQ QUESTIONS 1-9: 0

## 2025-03-31 ENCOUNTER — OFFICE VISIT (OUTPATIENT)
Dept: FAMILY MEDICINE CLINIC | Age: 60
End: 2025-03-31
Payer: COMMERCIAL

## 2025-03-31 VITALS
DIASTOLIC BLOOD PRESSURE: 62 MMHG | BODY MASS INDEX: 26.64 KG/M2 | WEIGHT: 201 LBS | HEIGHT: 73 IN | OXYGEN SATURATION: 99 % | HEART RATE: 57 BPM | SYSTOLIC BLOOD PRESSURE: 124 MMHG

## 2025-03-31 DIAGNOSIS — I10 ESSENTIAL HYPERTENSION: ICD-10-CM

## 2025-03-31 DIAGNOSIS — E78.1 HYPERTRIGLYCERIDEMIA: ICD-10-CM

## 2025-03-31 DIAGNOSIS — Z00.00 WELL ADULT EXAM: Primary | ICD-10-CM

## 2025-03-31 DIAGNOSIS — R73.03 PREDIABETES: ICD-10-CM

## 2025-03-31 PROCEDURE — 99396 PREV VISIT EST AGE 40-64: CPT | Performed by: FAMILY MEDICINE

## 2025-03-31 PROCEDURE — 3078F DIAST BP <80 MM HG: CPT | Performed by: FAMILY MEDICINE

## 2025-03-31 PROCEDURE — 3074F SYST BP LT 130 MM HG: CPT | Performed by: FAMILY MEDICINE

## 2025-03-31 RX ORDER — NEBIVOLOL 10 MG/1
10 TABLET ORAL DAILY
Qty: 90 TABLET | Refills: 3 | Status: SHIPPED | OUTPATIENT
Start: 2025-03-31

## 2025-03-31 RX ORDER — BENAZEPRIL HYDROCHLORIDE 20 MG/1
20 TABLET ORAL DAILY
Qty: 90 TABLET | Refills: 3 | Status: SHIPPED | OUTPATIENT
Start: 2025-03-31

## 2025-03-31 RX ORDER — TIRZEPATIDE 10 MG/.5ML
10 INJECTION, SOLUTION SUBCUTANEOUS WEEKLY
Qty: 2 ML | Refills: 5 | Status: SHIPPED | OUTPATIENT
Start: 2025-03-31

## 2025-03-31 SDOH — ECONOMIC STABILITY: FOOD INSECURITY: WITHIN THE PAST 12 MONTHS, THE FOOD YOU BOUGHT JUST DIDN'T LAST AND YOU DIDN'T HAVE MONEY TO GET MORE.: NEVER TRUE

## 2025-03-31 SDOH — ECONOMIC STABILITY: FOOD INSECURITY: WITHIN THE PAST 12 MONTHS, YOU WORRIED THAT YOUR FOOD WOULD RUN OUT BEFORE YOU GOT MONEY TO BUY MORE.: NEVER TRUE

## 2025-03-31 ASSESSMENT — ENCOUNTER SYMPTOMS: RESPIRATORY NEGATIVE: 1

## 2025-03-31 NOTE — PROGRESS NOTES
Umesh Shin (:  1965) is a 60 y.o. male,Established patient, here for evaluation of the following chief complaint(s):  Annual Exam      Assessment & Plan   ASSESSMENT/PLAN:  Umesh \"Chad\" was seen today for annual exam.    Diagnoses and all orders for this visit:    Well adult exam  Good diet and exercise.  Losing wt with zepbound  Essential hypertension  The current medical regimen is effective;  continue present plan and medications.   Hypertriglyceridemia  Improved. Can d/c fish oil and recheck  BMI 32.0-32.9,adult  Improving.            No follow-ups on file.         Subjective   SUBJECTIVE/OBJECTIVE:  HPI  Pt is a of 60 y.o. male comes in today with   Chief Complaint   Patient presents with    Annual Exam     Has been active with renovations.  Has lost wt.  Following with urology  Has had some foot pain.   Saw podiatry for orthotics and foot and ankle specialist at [facility].    Vitals:    25 0822   BP: 124/62   Pulse: 57   SpO2: 99%   Weight: 91.2 kg (201 lb)   Height: 1.854 m (6' 1\")     Past Medical History:Reviewed  Medications:Reviewed.  Allergies   Allergen Reactions    Niacin Rash      Social hx:Reviewed.  Social History     Tobacco Use   Smoking Status Never   Smokeless Tobacco Never     Review of Systems   Constitutional: Negative.    Respiratory: Negative.     Cardiovascular: Negative.           Objective   Physical Exam  Constitutional:       Appearance: Normal appearance. He is well-developed.   HENT:      Head: Normocephalic and atraumatic.      Mouth/Throat:      Pharynx: Oropharynx is clear.   Eyes:      General: No scleral icterus.     Conjunctiva/sclera: Conjunctivae normal.   Neck:      Thyroid: No thyromegaly.   Cardiovascular:      Rate and Rhythm: Normal rate and regular rhythm.      Heart sounds: Normal heart sounds. No murmur heard.  Pulmonary:      Effort: Pulmonary effort is normal.      Breath sounds: Normal breath sounds. No wheezing or rales.   Abdominal:

## 2025-04-12 DIAGNOSIS — E78.1 HYPERTRIGLYCERIDEMIA: ICD-10-CM

## 2025-04-14 RX ORDER — ICOSAPENT ETHYL 1000 MG/1
2 CAPSULE ORAL 2 TIMES DAILY
Qty: 360 CAPSULE | Refills: 0 | Status: SHIPPED | OUTPATIENT
Start: 2025-04-14

## 2025-04-14 NOTE — TELEPHONE ENCOUNTER
Medication:   Requested Prescriptions     Pending Prescriptions Disp Refills    VASCEPA 1 g CAPS capsule [Pharmacy Med Name: Vascepa 1 GM Oral Capsule] 360 capsule 0     Sig: Take 2 capsules by mouth twice daily        Last Filled:  12/20/24    Pended to:  [facility] [address] [place] [address] - [address] - P [phone] - F [phone]     Patient Phone Number: [phone] (home)     Last appt: 3/31/2025   Next appt: Visit date not found    Last OARRS:        No data to display